# Patient Record
(demographics unavailable — no encounter records)

---

## 2025-05-24 NOTE — ASSESSMENT
[FreeTextEntry1] : Ms. CARTY 's questions were answered to her satisfaction.  She  expressed her  understanding and willingness to comply with the above recommendations.

## 2025-05-24 NOTE — HISTORY OF PRESENT ILLNESS
[de-identified] : 70F  with PMHx of GERD, DM, spinal stenosis, bilateral carpal tunnel syndrome, diagnosed with follicular lymphoma, low-grade, bulky intra-abdominal disease in 2016.  CASE SYNOPSIS: 5/25/2016: CT A/P-extensive, bulky, malignant intraperitoneal and retroperitoneal mass extending from mid abdomen to mid pelvis, enveloping the aorta and inferior vena cava and the retrocrural spaces, bilaterally down into the upper pelvis, infiltrating and thickening the right paracolic peritoneal reflections. 7/6/2016: CT-guided core biopsy FNA: CD10 positive B-cell lymphoma consistent with low-grade follicular lymphoma. 7/21/2016: Bone marrow biopsy/aspirate-monotypic B-cell (0.4% of cells), positive for kappa, CD19, CD20, CD10, FMC 7, CD23; negative CD5, consistent with CD10 positive B-cell lymphoma. 8/11-12/29/16: Bendamustine/rituximab x 6 cycles 6/2017-3/2019: Rituximab maintenance 3/26/2019: PET/CT-retroperitoneal soft tissue stranding with FDG avid ET equal to mediastinal background probably treated disease; no evidence of recurrent lymphoma. March 2021: covid vaccine x 2 ( Moderna) 6/24/21: CT A/P- ill-defined retroperitoneal adenopathy with representative left periaortic node measuring 2 x 1.8 cm (previously approximately 1 x 0.9 cm).  Mesenteric adenopathy is new since prior, with representative node measuring 2.2 x 1.5 cm in 2018, difficult to measure on PET/CT of 2019.  Infiltrative changes in the root of the mesentery are similar to prior.  Conclusion: new mesenteric and enlarged retroperitoneal adenopathy. 9/2021- C diff colitis after short course of amoxicillin. 11/10/21-PET scan: few new enlarged FDG avid mesenteric and retroperitoneal lymph node compatible with recurrent lymphoma.  New small, mildly avid left axillary lymph node nonspecific.  Remainder of study is unchanged. 1/28/2022: Acute Covid infection 3/5/2022- CT A/P: increased mesenteric adenopathy and essentially unchanged retroperitoneal adenopathy. 12/15/2022 CT A/P-slight increase in mesenteric adenopathy, smaller retroperitoneal lymphadenopathy 6/22/2023 CT A/P: Mesenteric adenopathy slightly increased since prior examination 12/15/2022 (largest metastasis measures 5.4 x 4.0 cm, previously 4.6 x 3.6 cm..  Subcentimeter retroperitoneal lymphadenopathy unchanged. 12/18/2024- CT A/P:IMPRESSION: Dominant mesenteric shadi mass involving mesenteric vessels and small bowel has enlarged since prior studies. More inferiorly, a smaller central mesenteric node has slightly decreased in size. 5/24- 6/3/2024 admitted at [facility] with acute renal colic secondary to nephrolithiasis and sepsis; completes 2-week course antibiotics. 3/1/2025 CT abdomen- IMPRESSION:  Since 12/20/2024, overall mild interval increased size of the conglomerate mesenteric shadi mass, similarly encasing the superior mesenteric vasculature, measures 3.9 x 3.5 x 10 cm, previously measured 3.6 x 3 x 10 cm when measured in a similar fashion, in keeping with known malignancy. Multiple additional prominent retroperitoneal lymph nodes are not significantly changed.   [FreeTextEntry1] : expectant surveillance\par  \par   [de-identified] : Reporting no significant changes in clinical status since last visit in December 2024, despite recent CT abdomen from 3/1/2025  showing mild interval increase size of the conglomerate mesenteric shadi mass encasing the superior mesenteric vasculature.  Denies B symptoms, hospitalizations, urgent care visits, etc. States she has presented facial sweating in the past, but it resolved. No new lithotripsy procedure since July 2024.  Physical examination unchanged from previous visit.  Appetite and weight preserved.  Continues to travel, has no limitations in physical activity.  Hematologic profile stable, with inflammatory markers ESR, beta-2 microglobulin and LDH in normal range.

## 2025-05-24 NOTE — HISTORY OF PRESENT ILLNESS
[de-identified] : 70F  with PMHx of GERD, DM, spinal stenosis, bilateral carpal tunnel syndrome, diagnosed with follicular lymphoma, low-grade, bulky intra-abdominal disease in 2016.  CASE SYNOPSIS: 5/25/2016: CT A/P-extensive, bulky, malignant intraperitoneal and retroperitoneal mass extending from mid abdomen to mid pelvis, enveloping the aorta and inferior vena cava and the retrocrural spaces, bilaterally down into the upper pelvis, infiltrating and thickening the right paracolic peritoneal reflections. 7/6/2016: CT-guided core biopsy FNA: CD10 positive B-cell lymphoma consistent with low-grade follicular lymphoma. 7/21/2016: Bone marrow biopsy/aspirate-monotypic B-cell (0.4% of cells), positive for kappa, CD19, CD20, CD10, FMC 7, CD23; negative CD5, consistent with CD10 positive B-cell lymphoma. 8/11-12/29/16: Bendamustine/rituximab x 6 cycles 6/2017-3/2019: Rituximab maintenance 3/26/2019: PET/CT-retroperitoneal soft tissue stranding with FDG avid ET equal to mediastinal background probably treated disease; no evidence of recurrent lymphoma. March 2021: covid vaccine x 2 ( Moderna) 6/24/21: CT A/P- ill-defined retroperitoneal adenopathy with representative left periaortic node measuring 2 x 1.8 cm (previously approximately 1 x 0.9 cm).  Mesenteric adenopathy is new since prior, with representative node measuring 2.2 x 1.5 cm in 2018, difficult to measure on PET/CT of 2019.  Infiltrative changes in the root of the mesentery are similar to prior.  Conclusion: new mesenteric and enlarged retroperitoneal adenopathy. 9/2021- C diff colitis after short course of amoxicillin. 11/10/21-PET scan: few new enlarged FDG avid mesenteric and retroperitoneal lymph node compatible with recurrent lymphoma.  New small, mildly avid left axillary lymph node nonspecific.  Remainder of study is unchanged. 1/28/2022: Acute Covid infection 3/5/2022- CT A/P: increased mesenteric adenopathy and essentially unchanged retroperitoneal adenopathy. 12/15/2022 CT A/P-slight increase in mesenteric adenopathy, smaller retroperitoneal lymphadenopathy 6/22/2023 CT A/P: Mesenteric adenopathy slightly increased since prior examination 12/15/2022 (largest metastasis measures 5.4 x 4.0 cm, previously 4.6 x 3.6 cm..  Subcentimeter retroperitoneal lymphadenopathy unchanged. 12/18/2024- CT A/P:IMPRESSION: Dominant mesenteric shadi mass involving mesenteric vessels and small bowel has enlarged since prior studies. More inferiorly, a smaller central mesenteric node has slightly decreased in size. 5/24- 6/3/2024 admitted at [facility] with acute renal colic secondary to nephrolithiasis and sepsis; completes 2-week course antibiotics. 3/1/2025 CT abdomen- IMPRESSION:  Since 12/20/2024, overall mild interval increased size of the conglomerate mesenteric shadi mass, similarly encasing the superior mesenteric vasculature, measures 3.9 x 3.5 x 10 cm, previously measured 3.6 x 3 x 10 cm when measured in a similar fashion, in keeping with known malignancy. Multiple additional prominent retroperitoneal lymph nodes are not significantly changed.   [FreeTextEntry1] : expectant surveillance\par  \par   [de-identified] : Reporting no significant changes in clinical status since last visit in December 2024, despite recent CT abdomen from 3/1/2025  showing mild interval increase size of the conglomerate mesenteric shadi mass encasing the superior mesenteric vasculature.  Denies B symptoms, hospitalizations, urgent care visits, etc. States she has presented facial sweating in the past, but it resolved. No new lithotripsy procedure since July 2024.  Physical examination unchanged from previous visit.  Appetite and weight preserved.  Continues to travel, has no limitations in physical activity.  Hematologic profile stable, with inflammatory markers ESR, beta-2 microglobulin and LDH in normal range.